# Patient Record
Sex: FEMALE | Race: WHITE
[De-identification: names, ages, dates, MRNs, and addresses within clinical notes are randomized per-mention and may not be internally consistent; named-entity substitution may affect disease eponyms.]

---

## 2019-04-12 ENCOUNTER — HOSPITAL ENCOUNTER (OUTPATIENT)
Dept: HOSPITAL 47 - RADUSWWP | Age: 29
Discharge: HOME | End: 2019-04-12
Payer: COMMERCIAL

## 2019-04-12 DIAGNOSIS — N64.4: Primary | ICD-10-CM

## 2019-04-12 DIAGNOSIS — N63.10: ICD-10-CM

## 2019-04-12 NOTE — USB
Reason for exam: clinical finding.

Indicated problem(s): lump or thickening in the right breast.



Physical Findings:

Nurse did not find any significant physical abnormalities on exam.



US Breast RT

Right complete breast ultrasound includes all four quadrants, the retroareolar 

region and axilla. Finding is negative.



These results were verbally communicated with the patient and result sheet given 

to the patient on 4/12/19.





ASSESSMENT: Negative, BI-RAD 1



RECOMMENDATION:

Routine screening mammogram of both breasts at age 38.

Manage patient on a clinical basis.

## 2022-07-30 ENCOUNTER — HOSPITAL ENCOUNTER (INPATIENT)
Dept: HOSPITAL 47 - EC | Age: 32
LOS: 3 days | Discharge: HOME | DRG: 872 | End: 2022-08-02
Attending: INTERNAL MEDICINE | Admitting: INTERNAL MEDICINE
Payer: COMMERCIAL

## 2022-07-30 DIAGNOSIS — A41.59: Primary | ICD-10-CM

## 2022-07-30 DIAGNOSIS — Z20.822: ICD-10-CM

## 2022-07-30 DIAGNOSIS — K81.9: ICD-10-CM

## 2022-07-30 DIAGNOSIS — F90.9: ICD-10-CM

## 2022-07-30 DIAGNOSIS — K59.00: ICD-10-CM

## 2022-07-30 DIAGNOSIS — N10: ICD-10-CM

## 2022-07-30 LAB
ALBUMIN SERPL-MCNC: 4.1 G/DL (ref 3.5–5)
ALP SERPL-CCNC: 63 U/L (ref 38–126)
ALT SERPL-CCNC: 25 U/L (ref 4–34)
ANION GAP SERPL CALC-SCNC: 6 MMOL/L
AST SERPL-CCNC: 33 U/L (ref 14–36)
BASOPHILS # BLD AUTO: 0.1 K/UL (ref 0–0.2)
BASOPHILS NFR BLD AUTO: 0 %
BUN SERPL-SCNC: 14 MG/DL (ref 7–17)
CALCIUM SPEC-MCNC: 9.2 MG/DL (ref 8.4–10.2)
CHLORIDE SERPL-SCNC: 100 MMOL/L (ref 98–107)
CO2 SERPL-SCNC: 26 MMOL/L (ref 22–30)
EOSINOPHIL # BLD AUTO: 0.1 K/UL (ref 0–0.7)
EOSINOPHIL NFR BLD AUTO: 1 %
ERYTHROCYTE [DISTWIDTH] IN BLOOD BY AUTOMATED COUNT: 3.96 M/UL (ref 3.8–5.4)
ERYTHROCYTE [DISTWIDTH] IN BLOOD: 13.9 % (ref 11.5–15.5)
GLUCOSE SERPL-MCNC: 113 MG/DL (ref 74–99)
HCT VFR BLD AUTO: 37.7 % (ref 34–46)
HGB BLD-MCNC: 12.5 GM/DL (ref 11.4–16)
HYALINE CASTS UR QL AUTO: 1 /LPF (ref 0–2)
LIPASE SERPL-CCNC: 42 U/L (ref 23–300)
LYMPHOCYTES # SPEC AUTO: 0.8 K/UL (ref 1–4.8)
LYMPHOCYTES NFR SPEC AUTO: 6 %
MCH RBC QN AUTO: 31.4 PG (ref 25–35)
MCHC RBC AUTO-ENTMCNC: 33 G/DL (ref 31–37)
MCV RBC AUTO: 95.1 FL (ref 80–100)
MONOCYTES # BLD AUTO: 0.5 K/UL (ref 0–1)
MONOCYTES NFR BLD AUTO: 3 %
NEUTROPHILS # BLD AUTO: 12.6 K/UL (ref 1.3–7.7)
NEUTROPHILS NFR BLD AUTO: 89 %
PH UR: 6.5 [PH] (ref 5–8)
PLATELET # BLD AUTO: 305 K/UL (ref 150–450)
POTASSIUM SERPL-SCNC: 4.1 MMOL/L (ref 3.5–5.1)
PROT SERPL-MCNC: 7.1 G/DL (ref 6.3–8.2)
PROT UR QL: (no result)
RBC UR QL: 8 /HPF (ref 0–5)
SODIUM SERPL-SCNC: 132 MMOL/L (ref 137–145)
SP GR UR: 1.02 (ref 1–1.03)
SQUAMOUS UR QL AUTO: <1 /HPF (ref 0–4)
UROBILINOGEN UR QL STRIP: <2 MG/DL (ref ?–2)
WBC # BLD AUTO: 14.1 K/UL (ref 3.8–10.6)
WBC # UR AUTO: 66 /HPF (ref 0–5)

## 2022-07-30 PROCEDURE — 83690 ASSAY OF LIPASE: CPT

## 2022-07-30 PROCEDURE — 36415 COLL VENOUS BLD VENIPUNCTURE: CPT

## 2022-07-30 PROCEDURE — 87040 BLOOD CULTURE FOR BACTERIA: CPT

## 2022-07-30 PROCEDURE — 76705 ECHO EXAM OF ABDOMEN: CPT

## 2022-07-30 PROCEDURE — 84703 CHORIONIC GONADOTROPIN ASSAY: CPT

## 2022-07-30 PROCEDURE — 87186 SC STD MICRODIL/AGAR DIL: CPT

## 2022-07-30 PROCEDURE — 96375 TX/PRO/DX INJ NEW DRUG ADDON: CPT

## 2022-07-30 PROCEDURE — 87635 SARS-COV-2 COVID-19 AMP PRB: CPT

## 2022-07-30 PROCEDURE — 81001 URINALYSIS AUTO W/SCOPE: CPT

## 2022-07-30 PROCEDURE — 80053 COMPREHEN METABOLIC PANEL: CPT

## 2022-07-30 PROCEDURE — 74021 RADEX ABDOMEN 3+ VIEWS: CPT

## 2022-07-30 PROCEDURE — 83605 ASSAY OF LACTIC ACID: CPT

## 2022-07-30 PROCEDURE — 99285 EMERGENCY DEPT VISIT HI MDM: CPT

## 2022-07-30 PROCEDURE — 96366 THER/PROPH/DIAG IV INF ADDON: CPT

## 2022-07-30 PROCEDURE — 96365 THER/PROPH/DIAG IV INF INIT: CPT

## 2022-07-30 PROCEDURE — 87077 CULTURE AEROBIC IDENTIFY: CPT

## 2022-07-30 PROCEDURE — 85025 COMPLETE CBC W/AUTO DIFF WBC: CPT

## 2022-07-30 PROCEDURE — 81025 URINE PREGNANCY TEST: CPT

## 2022-07-30 PROCEDURE — 87086 URINE CULTURE/COLONY COUNT: CPT

## 2022-07-30 PROCEDURE — 74177 CT ABD & PELVIS W/CONTRAST: CPT

## 2022-07-30 RX ADMIN — Medication PRN MG: at 23:10

## 2022-07-30 RX ADMIN — ACETAMINOPHEN PRN MG: 325 TABLET, FILM COATED ORAL at 22:33

## 2022-07-30 RX ADMIN — CEFAZOLIN SCH MLS/HR: 330 INJECTION, POWDER, FOR SOLUTION INTRAMUSCULAR; INTRAVENOUS at 23:10

## 2022-07-30 RX ADMIN — MORPHINE SULFATE PRN MG: 4 INJECTION, SOLUTION INTRAMUSCULAR; INTRAVENOUS at 21:21

## 2022-07-30 NOTE — US
EXAMINATION TYPE: US gallbladder

 

DATE OF EXAM: 7/30/2022

 

COMPARISON:

 

CLINICAL HISTORY: Right upper quadrant abdominal pain. Pain.  Fever per patient.

 

TECHNIQUE: Multiple sonographic images of the right upper quadrant are obtained.

 

FINDINGS:

 

EXAM MEASUREMENTS:

 

Liver Length:  17.1 cm   

Gallbladder Wall:  0.1 cm   

CBD:  0.5 cm

Right Kidney:  12.6 x 5.7 x 5.8 cm

 

Pancreas:  wnl

Liver:  wnl  

Gallbladder:  wnl

**Evidence for sonographic Duque's sign:  neg

CBD:  wnl 

Right Kidney:  No hydronephrosis or masses seen 

 

 

 

IMPRESSION: 

Normal exam. No gallstones or dilated ducts.

## 2022-07-30 NOTE — ED
Abdominal Pain HPI





- General


Chief Complaint: Abdominal Pain


Stated Complaint: Abd Pain/Sent by urgent care


Time Seen by Provider: 07/30/22 16:03


Source: patient, RN notes reviewed


Mode of arrival: ambulatory


Limitations: no limitations





- History of Present Illness


Initial Comments: 





This is a pleasant 32-year-old female who presents to emergency department 

complaining of right-sided abdominal pain for the past 4 days.  Patient also 

complaining of fever and shaking chills.  Loss of appetite and pain in the right

upper quadrant currently.  Sharp in nature, exacerbated by movement, deep 

breathing, and palpation.  Does radiate to the right back area.





No headache, no changes in vision or hearing, no sore throat or difficulty with 

speech, no neck pain, no chest pain or shortness of breath,  no vomiting, no 

changes in urination or bowel movements, no numbness or tingling, no extremity 

pain, no skin rashes or lesions.


Denies vaginal discharge.  Denies chance of pregnancy.  No previous surgeries.





Past medical, surgical, social, and family history reviewed.


MD Complaint: abdominal pain





- Related Data


                                    Allergies











Allergy/AdvReac Type Severity Reaction Status Date / Time


 


No Known Allergies Allergy   Verified 07/30/22 15:56














Review of Systems


ROS Statement: 


Those systems with pertinent positive or pertinent negative responses have been 

documented in the HPI.





ROS Other: All systems not noted in ROS Statement are negative.





Past Medical History


Past Medical History: No Reported History


History of Any Multi-Drug Resistant Organisms: None Reported


Past Surgical History: No Surgical Hx Reported


Past Psychological History: No Psychological Hx Reported


Smoking Status: Never smoker


Past Alcohol Use History: None Reported


Past Drug Use History: Marijuana





General Exam





- General Exam Comments


Initial Comments: 





Patient tachycardic, remainder of vital signs are stable.  Patient does have 

notable shaking chills.  Appears to be adequately perfused.  Normal capillary 

refill.  No mottling.  Moist mucous membranes


Limitations: no limitations


General appearance: in distress


Head exam: Present: atraumatic, normocephalic, normal inspection


Eye exam: Present: normal appearance, PERRL, EOMI.  Absent: scleral icterus, 

conjunctival injection, periorbital swelling


ENT exam: Present: normal exam, normal oropharynx, mucous membranes dry, mucous 

membranes moist, normal external ear exam


Neck exam: Present: normal inspection, full ROM.  Absent: tenderness, 

meningismus, lymphadenopathy


Respiratory exam: Present: normal lung sounds bilaterally.  Absent: respiratory 

distress, wheezes, rales, rhonchi, stridor, chest wall tenderness, accessory 

muscle use, decreased breath sounds, prolonged expiratory


Cardiovascular Exam: Present: regular rate, normal rhythm, tachycardia, normal 

heart sounds.  Absent: systolic murmur, diastolic murmur, rubs, gallop, clicks


GI/Abdominal exam: Present: soft, tenderness (Right upper quadrant), guarding, 

diminished bowel sounds.  Absent: distended, rebound, rigid


  ** Expanded


GI/Abdominal exam: Present: Duque's sign.  Absent: Rovsing's sign, tenderness 

at McBurney's Point


Rectal exam: Present: deferred


External exam: Present: other (Deferred)


Extremities exam: Present: normal inspection, full ROM, normal capillary refill.

 Absent: tenderness, pedal edema, joint swelling, calf tenderness


Back exam: Present: normal inspection


Neurological exam: Present: alert, oriented X3, CN II-XII intact


Psychiatric exam: Present: normal affect, normal mood


Skin exam: Present: warm, dry, intact, normal color.  Absent: rash





Course


                                   Vital Signs











  07/30/22 07/30/22





  15:54 18:00


 


Temperature 98.3 F 


 


Pulse Rate 111 H 


 


Respiratory 20 18





Rate  


 


Blood Pressure 145/83 109/68


 


O2 Sat by Pulse 100 





Oximetry  














- Reevaluation(s)


Reevaluation #1: 





07/30/22 17:32


Medical record is reviewed


Symptoms are improved after pain medications


Patient is informed of results and questions answered


Mild distress


Reevaluation #2: 





07/30/22 18:16


She reevaluated, patient doing no better.  We'll add on a dose of morphine.  

Gallbladder ultrasound was essentially normal.  Her back to considering 

appendicitis as the patient's pain actually started the right lower quadrant.  

Computed tomography scan ordered.


Reevaluation #3: 





07/30/22 20:03


Patient episode of vomiting despite antiemetics in the room.  We'll consider 

repeat antiemetics.  Plan for admission





- Consultations


Consultation #1: 





Case discussed in detail with Dr. kennedy, patient accepted for admission for 

sepsis and pyelonephritis





Medical Decision Making





- Medical Decision Making





Patient presents with right abdominal pain.  Most consistent with cholecystitis.

 Although the patient states that the pain seemed to start in the right lower 

quadrant a few days ago and now has relocated to the right upper quadrant.  

Appendicitis possible but less likely as she has no tenderness in the right 

lower quadrant currently.  Patient has no vaginal discharge.  Gynecological 

etiology less likely.  Although Gl[place] Burton syndrome is a possibility.  Does

not seem to be consistent with ureteral colic.  Pyelonephritis possible.  We'll 

order a general abdominal workup, blood cultures, lactic acid, Zosyn, 

gallbladder ultrasound, and x-rays.  Plan for evaluation





The case was discussed in detail with ED attending physician.  Presentation, 

findings, treatment plan discussed in detail.





Supervisor Dr. Butler





Patient was vomiting in the room on reassessment.  Patient be admitted for 

sepsis and pyelonephritis.  Zosyn and switched to Rocephin after discussion with

the hospitalist physician.





- Lab Data


Result diagrams: 


                                 07/30/22 16:26





                                 07/30/22 16:26


                                   Lab Results











  07/30/22 07/30/22 07/30/22 Range/Units





  16:26 16:26 16:26 


 


WBC  14.1 H    (3.8-10.6)  k/uL


 


RBC  3.96    (3.80-5.40)  m/uL


 


Hgb  12.5    (11.4-16.0)  gm/dL


 


Hct  37.7    (34.0-46.0)  %


 


MCV  95.1    (80.0-100.0)  fL


 


MCH  31.4    (25.0-35.0)  pg


 


MCHC  33.0    (31.0-37.0)  g/dL


 


RDW  13.9    (11.5-15.5)  %


 


Plt Count  305    (150-450)  k/uL


 


MPV  7.3    


 


Neutrophils %  89    %


 


Lymphocytes %  6    %


 


Monocytes %  3    %


 


Eosinophils %  1    %


 


Basophils %  0    %


 


Neutrophils #  12.6 H    (1.3-7.7)  k/uL


 


Lymphocytes #  0.8 L    (1.0-4.8)  k/uL


 


Monocytes #  0.5    (0-1.0)  k/uL


 


Eosinophils #  0.1    (0-0.7)  k/uL


 


Basophils #  0.1    (0-0.2)  k/uL


 


Sodium     (137-145)  mmol/L


 


Potassium     (3.5-5.1)  mmol/L


 


Chloride     ()  mmol/L


 


Carbon Dioxide     (22-30)  mmol/L


 


Anion Gap     mmol/L


 


BUN     (7-17)  mg/dL


 


Creatinine     (0.52-1.04)  mg/dL


 


Est GFR (CKD-EPI)AfAm     (>60 ml/min/1.73 sqM)  


 


Est GFR (CKD-EPI)NonAf     (>60 ml/min/1.73 sqM)  


 


Glucose     (74-99)  mg/dL


 


Plasma Lactic Acid Jose     (0.7-2.0)  mmol/L


 


Calcium     (8.4-10.2)  mg/dL


 


Total Bilirubin     (0.2-1.3)  mg/dL


 


AST     (14-36)  U/L


 


ALT     (4-34)  U/L


 


Alkaline Phosphatase     ()  U/L


 


Total Protein     (6.3-8.2)  g/dL


 


Albumin     (3.5-5.0)  g/dL


 


Lipase     ()  U/L


 


Urine Color   Yellow   


 


Urine Appearance   Clear   (Clear)  


 


Urine pH   6.5   (5.0-8.0)  


 


Ur Specific Gravity   1.021   (1.001-1.035)  


 


Urine Protein   1+ H   (Negative)  


 


Urine Glucose (UA)   Negative   (Negative)  


 


Urine Ketones   Negative   (Negative)  


 


Urine Blood   Negative   (Negative)  


 


Urine Nitrite   Positive H   (Negative)  


 


Urine Bilirubin   Negative   (Negative)  


 


Urine Urobilinogen   <2.0   (<2.0)  mg/dL


 


Ur Leukocyte Esterase   Moderate H   (Negative)  


 


Urine RBC   8 H   (0-5)  /hpf


 


Urine WBC   66 H   (0-5)  /hpf


 


Ur Squamous Epith Cells   <1   (0-4)  /hpf


 


Urine Bacteria   Moderate H   (None)  /hpf


 


Hyaline Casts   1   (0-2)  /lpf


 


Urine Mucus   Rare H   (None)  /hpf


 


Urine HCG, Qual    Not Detected  (Not Detectd)  


 


Coronavirus (PCR)     (Not Detectd)  














  07/30/22 07/30/22 07/30/22 Range/Units





  16:26 16:26 18:38 


 


WBC     (3.8-10.6)  k/uL


 


RBC     (3.80-5.40)  m/uL


 


Hgb     (11.4-16.0)  gm/dL


 


Hct     (34.0-46.0)  %


 


MCV     (80.0-100.0)  fL


 


MCH     (25.0-35.0)  pg


 


MCHC     (31.0-37.0)  g/dL


 


RDW     (11.5-15.5)  %


 


Plt Count     (150-450)  k/uL


 


MPV     


 


Neutrophils %     %


 


Lymphocytes %     %


 


Monocytes %     %


 


Eosinophils %     %


 


Basophils %     %


 


Neutrophils #     (1.3-7.7)  k/uL


 


Lymphocytes #     (1.0-4.8)  k/uL


 


Monocytes #     (0-1.0)  k/uL


 


Eosinophils #     (0-0.7)  k/uL


 


Basophils #     (0-0.2)  k/uL


 


Sodium  132 L    (137-145)  mmol/L


 


Potassium  4.1    (3.5-5.1)  mmol/L


 


Chloride  100    ()  mmol/L


 


Carbon Dioxide  26    (22-30)  mmol/L


 


Anion Gap  6    mmol/L


 


BUN  14    (7-17)  mg/dL


 


Creatinine  0.56    (0.52-1.04)  mg/dL


 


Est GFR (CKD-EPI)AfAm  >90    (>60 ml/min/1.73 sqM)  


 


Est GFR (CKD-EPI)NonAf  >90    (>60 ml/min/1.73 sqM)  


 


Glucose  113 H    (74-99)  mg/dL


 


Plasma Lactic Acid Jose   0.9   (0.7-2.0)  mmol/L


 


Calcium  9.2    (8.4-10.2)  mg/dL


 


Total Bilirubin  0.4    (0.2-1.3)  mg/dL


 


AST  33    (14-36)  U/L


 


ALT  25    (4-34)  U/L


 


Alkaline Phosphatase  63    ()  U/L


 


Total Protein  7.1    (6.3-8.2)  g/dL


 


Albumin  4.1    (3.5-5.0)  g/dL


 


Lipase  42    ()  U/L


 


Urine Color     


 


Urine Appearance     (Clear)  


 


Urine pH     (5.0-8.0)  


 


Ur Specific Gravity     (1.001-1.035)  


 


Urine Protein     (Negative)  


 


Urine Glucose (UA)     (Negative)  


 


Urine Ketones     (Negative)  


 


Urine Blood     (Negative)  


 


Urine Nitrite     (Negative)  


 


Urine Bilirubin     (Negative)  


 


Urine Urobilinogen     (<2.0)  mg/dL


 


Ur Leukocyte Esterase     (Negative)  


 


Urine RBC     (0-5)  /hpf


 


Urine WBC     (0-5)  /hpf


 


Ur Squamous Epith Cells     (0-4)  /hpf


 


Urine Bacteria     (None)  /hpf


 


Hyaline Casts     (0-2)  /lpf


 


Urine Mucus     (None)  /hpf


 


Urine HCG, Qual     (Not Detectd)  


 


Coronavirus (PCR)    Not Detected  (Not Detectd)  














Disposition


Clinical Impression: 


 Sepsis, Pyelonephritis





Disposition: ADMITTED AS IP TO THIS HOSP


Condition: Fair


Referrals: 


Ramonita Lester MD [Primary Care Provider] - 1-2 days


Time of Disposition: 20:04


Decision to Admit Reason: Admit from EC


Decision Time: 20:04

## 2022-07-30 NOTE — CT
EXAMINATION TYPE: CT abdomen pelvis w con

 

DATE OF EXAM: 7/30/2022

 

COMPARISON: None

 

HISTORY: Right-sided abdominal pain, radiates to lower back

 

CT DLP: 675.8 mGycm

Automated exposure control for dose reduction was used.

 

CONTRAST: 

Performed with IV Contrast, patient injected with 100ml mL of Isovue 300.

 

Images obtained from the diaphragm to the floor the pelvis with IV contrast.

 

Lung bases show mild subsegmental atelectasis. Heart size is normal. No pericardial effusion. No pleu
ral effusion.

 

Liver spleen stomach and pancreas appear intact. The bile duct is not dilated. Gallbladder is intact.
 Gallbladder measures 3.6 cm.

 

There is no adrenal mass. There is patchy decreased enhancement in the right kidney in the upper and 
lower pole. Left kidney appears fairly normal. There is no hydronephrosis. Delayed images show normal
 renal excretion. No retroperitoneal adenopathy. The bladder distends smoothly. Uterus is retroverted
.

 

Lumbar vertebrae have normal alignment. There is vacuum disc at L5-S1. There is spurring of the endpl
ates. Bony pelvis is intact. The hip joints are intact. Appendix not seen. No sign of thickened appen
rita.

 

There is no mesenteric edema. No ascites or free air. No bowel obstruction. There is mild edema aroun
d the right kidney.

 

IMPRESSION:

Patchy decreased enhancement of the right kidney with perinephric edema that is consistent with multi
focal acute pyelonephritis. No renal obstruction.

## 2022-07-30 NOTE — XR
EXAMINATION TYPE: XR abdomen complete w decub

 

DATE OF EXAM: 7/30/2022

 

COMPARISON: NONE

 

HISTORY: Right upper quadrant pain

 

TECHNIQUE: 4 views

 

FINDINGS: There is a single loop of gas-distended small bowel in the left mid abdomen. Fecal pattern 
is normal. No evidence of a mass. There is IUD. Lung bases are clear. No calcifications seen over the
 kidneys.

 

IMPRESSION: Distended small bowel loop could relate to some mild focal ileus. No free air.

## 2022-07-31 LAB
BASOPHILS # BLD AUTO: 0.05 X 10*3/UL (ref 0–0.1)
BASOPHILS NFR BLD AUTO: 0.4 %
EOSINOPHIL # BLD AUTO: 0.01 X 10*3/UL (ref 0.04–0.35)
EOSINOPHIL NFR BLD AUTO: 0.1 %
ERYTHROCYTE [DISTWIDTH] IN BLOOD BY AUTOMATED COUNT: 3.44 X 10*6/UL (ref 4.1–5.2)
ERYTHROCYTE [DISTWIDTH] IN BLOOD: 14.4 % (ref 11.5–14.5)
HCT VFR BLD AUTO: 31.9 % (ref 37.2–46.3)
HGB BLD-MCNC: 9.9 G/DL (ref 12–15)
IMM GRANULOCYTES BLD QL AUTO: 0.6 %
LYMPHOCYTES # SPEC AUTO: 0.84 X 10*3/UL (ref 0.9–5)
LYMPHOCYTES NFR SPEC AUTO: 6 %
MCH RBC QN AUTO: 28.8 PG (ref 27–32)
MCHC RBC AUTO-ENTMCNC: 31 G/DL (ref 32–37)
MCV RBC AUTO: 92.7 FL (ref 80–97)
MONOCYTES # BLD AUTO: 1.26 X 10*3/UL (ref 0.2–1)
MONOCYTES NFR BLD AUTO: 9 %
NEUTROPHILS # BLD AUTO: 11.82 X 10*3/UL (ref 1.8–7.7)
NEUTROPHILS NFR BLD AUTO: 83.9 %
NRBC BLD AUTO-RTO: 0 /100 WBCS (ref 0–0)
PLATELET # BLD AUTO: 271 X 10*3/UL (ref 140–440)
WBC # BLD AUTO: 14.06 X 10*3/UL (ref 4.5–10)

## 2022-07-31 RX ADMIN — PANTOPRAZOLE SODIUM SCH MG: 40 INJECTION, POWDER, FOR SOLUTION INTRAVENOUS at 13:40

## 2022-07-31 RX ADMIN — ACETAMINOPHEN PRN MG: 325 TABLET, FILM COATED ORAL at 22:45

## 2022-07-31 RX ADMIN — ACETAMINOPHEN PRN MG: 325 TABLET, FILM COATED ORAL at 11:22

## 2022-07-31 RX ADMIN — CEFAZOLIN SCH MLS/HR: 330 INJECTION, POWDER, FOR SOLUTION INTRAMUSCULAR; INTRAVENOUS at 04:26

## 2022-07-31 RX ADMIN — PANTOPRAZOLE SODIUM SCH MG: 40 INJECTION, POWDER, FOR SOLUTION INTRAVENOUS at 21:04

## 2022-07-31 RX ADMIN — MORPHINE SULFATE PRN MG: 4 INJECTION, SOLUTION INTRAMUSCULAR; INTRAVENOUS at 04:28

## 2022-07-31 RX ADMIN — HYDROCODONE BITARTRATE AND ACETAMINOPHEN PRN EACH: 5; 325 TABLET ORAL at 23:56

## 2022-07-31 RX ADMIN — ONDANSETRON PRN MG: 2 INJECTION INTRAMUSCULAR; INTRAVENOUS at 04:42

## 2022-07-31 RX ADMIN — CEFAZOLIN SCH: 330 INJECTION, POWDER, FOR SOLUTION INTRAMUSCULAR; INTRAVENOUS at 11:04

## 2022-07-31 RX ADMIN — ACETAMINOPHEN PRN MG: 325 TABLET, FILM COATED ORAL at 04:26

## 2022-07-31 RX ADMIN — MORPHINE SULFATE PRN MG: 4 INJECTION, SOLUTION INTRAMUSCULAR; INTRAVENOUS at 17:38

## 2022-07-31 RX ADMIN — HYDROCODONE BITARTRATE AND ACETAMINOPHEN PRN EACH: 5; 325 TABLET ORAL at 13:40

## 2022-07-31 RX ADMIN — MORPHINE SULFATE PRN MG: 4 INJECTION, SOLUTION INTRAMUSCULAR; INTRAVENOUS at 09:20

## 2022-07-31 RX ADMIN — CEFAZOLIN SCH MLS/HR: 330 INJECTION, POWDER, FOR SOLUTION INTRAMUSCULAR; INTRAVENOUS at 20:26

## 2022-07-31 RX ADMIN — MORPHINE SULFATE PRN MG: 4 INJECTION, SOLUTION INTRAMUSCULAR; INTRAVENOUS at 21:36

## 2022-07-31 NOTE — HP
HISTORY AND PHYSICAL



CHIEF COMPLAINTS:

Abdominal pain and fever.



HISTORY OF PRESENT ILLNESS:

This 32-year-old woman with a past medical history of no significant medical 
issues,

followed by Dr. Lester in the outpatient setting, was not feeling well over the 
past

several days.  The patient is mainly complaining of right-sided abdominal pain. 
Patient

had back pain also, but currently the patient is complaining of more pain in the

anterior part.  The patient also had some loss of appetite. Patient was found to
have

UTI and possible pyelonephritis.  Ultrasound of the gallbladder did not show any
acute

abnormality.  The CT scan was reviewed personally by me.  Patient is started on 
broad-

spectrum IV antibiotics. Cultures are obtained. Infectious disease evaluation is

recommended, also.  The CT scan showed patchy decreased enhancement of the right
kidney

with perinephric edema with multifocal acute pyelonephritis.



PAST MEDICAL HISTORY:

No significant cardiorespiratory illness.



HOME MEDICATIONS:

Reviewed. They include Motrin p.r.n.



ALLERGIES:

NONE.



FAMILY HISTORY:

No history of heart disease or strokes in the family.



SOCIAL HISTORY:

No history of smoking.



REVIEW OF SYSTEMS:

Fourteen-point review of systems negative except as mentioned earlier.



PHYSICAL EXAMINATION:

Pulse is 88, blood pressure 107/70, respiration 18.

HEENT: Conjunctivae normal.

NECK: No jugular venous distention.

CARDIOVASCULAR: S1, S2 muffled.

RESPIRATION: Breath sounds diminished at the bases.  No rhonchi. No crackles.

ABDOMEN: Soft. Mild diffuse tenderness in the right side present.  Otherwise, no

guarding, no rigidity.  No mass palpable.

LEGS: No edema. No swelling.

NERVOUS SYSTEM: Higher functions as mentioned earlier. Moves all 4 limbs.  No 
focal

motor or sensory deficit.

LYMPHATICS: No lymph node palpable in neck, axillae or groin.

SKIN: No ulcer, rash, bleeding.

JOINTS: No active deforming arthropathy.



LABS:

WBC 14.6. Sodium is ntd



ASSESSMENT:

1. Acute right pyelonephritis with severe pain.

2. Elevated white count.



RECOMMENDATIONS AND DISCUSSION:

In this 32-year-old woman who presented with multiple complex medical issues, at
this

time I recommend to continue current medications. Patient is started on IV 
Rocephin.  I

would recommend infectious disease evaluation.  Repeat cultures.  DVT 
prophylaxis.

Continue to monitor. Pain management.  The prognosis is guarded because of the 
multiple

complex medical issues. Further recommendations to follow. See orders for 
further

details. Dr. Lester will follow tomorrow.





MMODL / IJN: [id number] / Job#: [id number]

BLANCA

## 2022-08-01 LAB
ALBUMIN SERPL-MCNC: 3.3 G/DL (ref 3.8–4.9)
ALBUMIN/GLOB SERPL: 1.43 G/DL (ref 1.6–3.17)
ALP SERPL-CCNC: 94 U/L (ref 41–126)
ALT SERPL-CCNC: 44 U/L (ref 8–44)
ANION GAP SERPL CALC-SCNC: 6.5 MMOL/L (ref 10–18)
AST SERPL-CCNC: 41 U/L (ref 13–35)
BASOPHILS # BLD AUTO: 0.04 X 10*3/UL (ref 0–0.1)
BASOPHILS NFR BLD AUTO: 0.4 %
BUN SERPL-SCNC: 7.8 MG/DL (ref 9–27)
BUN/CREAT SERPL: 15.6 RATIO (ref 12–20)
CALCIUM SPEC-MCNC: 8.2 MG/DL (ref 8.7–10.3)
CHLORIDE SERPL-SCNC: 104 MMOL/L (ref 96–109)
CO2 SERPL-SCNC: 25.5 MMOL/L (ref 20–27.5)
EOSINOPHIL # BLD AUTO: 0.11 X 10*3/UL (ref 0.04–0.35)
EOSINOPHIL NFR BLD AUTO: 1 %
ERYTHROCYTE [DISTWIDTH] IN BLOOD BY AUTOMATED COUNT: 3.68 X 10*6/UL (ref 4.1–5.2)
ERYTHROCYTE [DISTWIDTH] IN BLOOD: 14.6 % (ref 11.5–14.5)
GLOBULIN SER CALC-MCNC: 2.3 G/DL (ref 1.6–3.3)
GLUCOSE SERPL-MCNC: 117 MG/DL (ref 70–110)
HCT VFR BLD AUTO: 35 % (ref 37.2–46.3)
HGB BLD-MCNC: 10.6 G/DL (ref 12–15)
IMM GRANULOCYTES BLD QL AUTO: 0.4 %
LYMPHOCYTES # SPEC AUTO: 2.02 X 10*3/UL (ref 0.9–5)
LYMPHOCYTES NFR SPEC AUTO: 19.1 %
MCH RBC QN AUTO: 28.8 PG (ref 27–32)
MCHC RBC AUTO-ENTMCNC: 30.3 G/DL (ref 32–37)
MCV RBC AUTO: 95.1 FL (ref 80–97)
MONOCYTES # BLD AUTO: 1.02 X 10*3/UL (ref 0.2–1)
MONOCYTES NFR BLD AUTO: 9.6 %
NEUTROPHILS # BLD AUTO: 7.37 X 10*3/UL (ref 1.8–7.7)
NEUTROPHILS NFR BLD AUTO: 69.5 %
NRBC BLD AUTO-RTO: 0 /100 WBCS (ref 0–0)
PLATELET # BLD AUTO: 296 X 10*3/UL (ref 140–440)
POTASSIUM SERPL-SCNC: 3.9 MMOL/L (ref 3.5–5.5)
PROT SERPL-MCNC: 5.6 G/DL (ref 6.2–8.2)
SODIUM SERPL-SCNC: 136 MMOL/L (ref 135–145)
WBC # BLD AUTO: 10.6 X 10*3/UL (ref 4.5–10)

## 2022-08-01 RX ADMIN — MORPHINE SULFATE PRN MG: 4 INJECTION, SOLUTION INTRAMUSCULAR; INTRAVENOUS at 17:10

## 2022-08-01 RX ADMIN — CEFAZOLIN SCH: 330 INJECTION, POWDER, FOR SOLUTION INTRAMUSCULAR; INTRAVENOUS at 12:03

## 2022-08-01 RX ADMIN — MORPHINE SULFATE PRN MG: 4 INJECTION, SOLUTION INTRAMUSCULAR; INTRAVENOUS at 22:20

## 2022-08-01 RX ADMIN — MORPHINE SULFATE PRN MG: 4 INJECTION, SOLUTION INTRAMUSCULAR; INTRAVENOUS at 09:51

## 2022-08-01 RX ADMIN — Medication PRN MG: at 22:20

## 2022-08-01 RX ADMIN — PANTOPRAZOLE SODIUM SCH MG: 40 INJECTION, POWDER, FOR SOLUTION INTRAVENOUS at 09:52

## 2022-08-01 RX ADMIN — HYDROCODONE BITARTRATE AND ACETAMINOPHEN PRN EACH: 5; 325 TABLET ORAL at 18:13

## 2022-08-01 RX ADMIN — MORPHINE SULFATE PRN MG: 4 INJECTION, SOLUTION INTRAMUSCULAR; INTRAVENOUS at 05:48

## 2022-08-01 RX ADMIN — CEFAZOLIN SCH: 330 INJECTION, POWDER, FOR SOLUTION INTRAMUSCULAR; INTRAVENOUS at 19:31

## 2022-08-01 RX ADMIN — CEFAZOLIN SCH MLS/HR: 330 INJECTION, POWDER, FOR SOLUTION INTRAMUSCULAR; INTRAVENOUS at 01:52

## 2022-08-01 RX ADMIN — ONDANSETRON PRN MG: 2 INJECTION INTRAMUSCULAR; INTRAVENOUS at 20:13

## 2022-08-01 RX ADMIN — MORPHINE SULFATE PRN MG: 4 INJECTION, SOLUTION INTRAMUSCULAR; INTRAVENOUS at 01:48

## 2022-08-01 RX ADMIN — PANTOPRAZOLE SODIUM SCH MG: 40 INJECTION, POWDER, FOR SOLUTION INTRAVENOUS at 20:13

## 2022-08-01 RX ADMIN — HYDROCODONE BITARTRATE AND ACETAMINOPHEN PRN EACH: 5; 325 TABLET ORAL at 12:05

## 2022-08-01 NOTE — P.CONS
History of Present Illness





- Reason for Consult


Consult date: 07/31/22





- History of Present Illness





Patient is a 32-year-old  female presenting to the hospital for 

evaluation of right-sided abdominal pain that has been going on for the last 4 

days patient described the pain to be more of a sharp in nature intensity is 

almost 10 out of 10 by the time she presented to the hospital with associated 

nausea and vomiting patient complaining of pain mostly in the right upper 

quadrant as well as to the right flank area patient denies having any diarrhea 

on presentation to the hospital the patient was initially afebrile subsequently 

she did spike a fever this afternoon of 100.1 high degree Fahrenheit patient did

have white count of 14.1 with a left shift kidney function has been normal 

patient did have positive UA blood and urine cultures are currently pending 

patient did have a gallbladder ultrasound that was negative no gallstones or 

dilated duct patient did have a CT of abdominal pelvis patchy enhancement of the

right kidney with perinephric edema consistent with the pyelonephritis no 

obstruction patient was started on ceftriaxone infectious disease was consulted 

for further management of antibiotic therapy





Past Medical History


Past Medical History: No Reported History


History of Any Multi-Drug Resistant Organisms: None Reported


Past Surgical History: No Surgical Hx Reported


Past Psychological History: No Psychological Hx Reported


Smoking Status: Never smoker


Past Alcohol Use History: None Reported


Past Drug Use History: Marijuana





Medications and Allergies


                                Home Medications











 Medication  Instructions  Recorded  Confirmed  Type


 


Ibuprofen [Motrin] 800 mg PO BID PRN 07/30/22 07/30/22 History








                                    Allergies











Allergy/AdvReac Type Severity Reaction Status Date / Time


 


No Known Allergies Allergy   Verified 07/30/22 20:36














Physical Exam


Vitals: 


                                   Vital Signs











  Temp Pulse Pulse Resp BP BP Pulse Ox


 


 07/31/22 14:16  99.9 F H   81  16   107/67  98


 


 07/31/22 14:00    81  16   


 


 07/31/22 13:30  100.1 F H   75    100/61 


 


 07/31/22 12:18  99.9 F H      


 


 07/31/22 11:20    88    107/70  100


 


 07/31/22 09:27    81  18   100/64 


 


 07/31/22 08:00    81  18   


 


 07/31/22 07:00  98.1 F   76  16   93/53  95


 


 07/31/22 03:28  98.4 F   84  18   113/69 


 


 07/31/22 02:30  98.9 F   84  18   100/50  95


 


 07/31/22 02:00    87    91/52 


 


 07/31/22 01:02  98.4 F   83  16   102/58  97


 


 07/30/22 22:30    84  18   


 


 07/30/22 22:01  99.2 F   95  17   108/69  100


 


 07/30/22 18:00     18  109/68  


 


 07/30/22 15:54  98.3 F  111 H   20  145/83   100








                                Intake and Output











 07/30/22 07/31/22 07/31/22





 22:59 06:59 14:59


 


Other:   


 


  # Voids  3 4


 


  Weight 70.307 kg  














Results


CBC & Chem 7: 


                                 07/31/22 05:09





                                 07/30/22 16:26


Labs: 


                  Abnormal Lab Results - Last 24 Hours (Table)











  07/30/22 07/30/22 07/30/22 Range/Units





  16:26 16:26 16:26 


 


WBC  14.1 H    (3.8-10.6)  k/uL


 


RBC     (4.10-5.20)  X 10*6/uL


 


Hgb     (12.0-15.0)  g/dL


 


Hct     (37.2-46.3)  %


 


MCHC     (32.0-37.0)  g/dL


 


Immature Gran #     (0.00-0.04)  X 10*3/uL


 


Neutrophils #  12.6 H    (1.3-7.7)  k/uL


 


Lymphocytes #  0.8 L    (1.0-4.8)  k/uL


 


Monocytes #     (0.20-1.00)  X 10*3/uL


 


Eosinophils #     (0.04-0.35)  X 10*3/uL


 


Sodium    132 L  (137-145)  mmol/L


 


Glucose    113 H  (74-99)  mg/dL


 


Urine Protein   1+ H   (Negative)  


 


Urine Nitrite   Positive H   (Negative)  


 


Ur Leukocyte Esterase   Moderate H   (Negative)  


 


Urine RBC   8 H   (0-5)  /hpf


 


Urine WBC   66 H   (0-5)  /hpf


 


Urine Bacteria   Moderate H   (None)  /hpf


 


Urine Mucus   Rare H   (None)  /hpf














  07/31/22 Range/Units





  05:09 


 


WBC  14.06 H  (3.8-10.6)  k/uL


 


RBC  3.44 L  (4.10-5.20)  X 10*6/uL


 


Hgb  9.9 L  (12.0-15.0)  g/dL


 


Hct  31.9 L  (37.2-46.3)  %


 


MCHC  31.0 L  (32.0-37.0)  g/dL


 


Immature Gran #  0.08 H  (0.00-0.04)  X 10*3/uL


 


Neutrophils #  11.82 H  (1.3-7.7)  k/uL


 


Lymphocytes #  0.84 L  (1.0-4.8)  k/uL


 


Monocytes #  1.26 H  (0.20-1.00)  X 10*3/uL


 


Eosinophils #  0.01 L  (0.04-0.35)  X 10*3/uL


 


Sodium   (137-145)  mmol/L


 


Glucose   (74-99)  mg/dL


 


Urine Protein   (Negative)  


 


Urine Nitrite   (Negative)  


 


Ur Leukocyte Esterase   (Negative)  


 


Urine RBC   (0-5)  /hpf


 


Urine WBC   (0-5)  /hpf


 


Urine Bacteria   (None)  /hpf


 


Urine Mucus   (None)  /hpf








                      Microbiology - Last 24 Hours (Table)











 07/30/22 16:26 Urine Culture - Preliminary





 Urine,Voided 














Assessment and Plan


Plan: 


1patient presented to hospital with sepsis and respiratory have a fever 

elevated white count tachycardia source is likely right-sided pyelonephritis 

likely from enteric gram-negative pathogen.


2Rocephin 2 g daily to continue while waiting for the culture to finalize.


3gentle IV fluid.


We will follow on clinical condition and cultures to further adjust medication 

if needed


Thank you for this consultation will follow this patient along with you





Time with Patient: Greater than 30

## 2022-08-01 NOTE — P.PN
Subjective


Progress Note Date: 08/01/22











On 08/01/2022 patient was seen and examined on the medical floor she is alert 

and oriented 3 in no apparent distress she is still complaining of pain in the 

right lower quadrant and in the right flank, she is complaining of constipation 

otherwise she denies any complaints there is no fever or chills no headache or 

dizziness no chest pain no shortness of breath no cough no nausea or vomiting no

diarrhea no blood in the stools no burning with urination no frequency or 

urgency and no hematuria.  White blood count has declined over the last 2 days, 

abdomen ultrasound and abdomen computed tomography scan results reviewed, at 

this time continue with current medications, will add 1 dose of milk of magnesia

and continue with IV Rocephin will recheck in a.m.





Objective





- Vital Signs


Vital signs: 


                                   Vital Signs











Temp  98.4 F   08/01/22 15:00


 


Pulse  71   08/01/22 15:00


 


Resp  18   08/01/22 15:00


 


BP  105/70   08/01/22 15:00


 


Pulse Ox  96   08/01/22 15:00


 


FiO2      








                                 Intake & Output











 07/31/22 08/01/22 08/01/22





 18:59 06:59 18:59


 


Intake Total   120


 


Balance   120


 


Intake:   


 


  Oral   120


 


Other:   


 


  # Voids 4 2 1














- Exam








In general patient is alert and oriented x 3 in no distress


HEENT head normocephalic and atraumatic


Neck is supple no JVD no goiter no lymphadenopathy no carotid bruit


Chest examination is clear to auscultation no crackles no wheezing


Cardiac exam reveals regular heart sounds S1 and S2 no gallops no murmurs


Abdomen is soft with tenderness in the right upper quadrant and right lower 

quadrant no organomegaly with normal bowel sounds


Extremity exam reveals no edema no cyanosis or clubbing


Neurological examination reveals no gross focal deficits





- Labs


CBC & Chem 7: 


                                 08/01/22 03:32





                                 08/01/22 03:32


Labs: 


                  Abnormal Lab Results - Last 24 Hours (Table)











  08/01/22 08/01/22 Range/Units





  03:32 03:32 


 


WBC  10.60 H   (4.50-10.00)  X 10*3/uL


 


RBC  3.68 L   (4.10-5.20)  X 10*6/uL


 


Hgb  10.6 L   (12.0-15.0)  g/dL


 


Hct  35.0 L   (37.2-46.3)  %


 


MCHC  30.3 L   (32.0-37.0)  g/dL


 


RDW  14.6 H   (11.5-14.5)  %


 


Monocytes #  1.02 H   (0.20-1.00)  X 10*3/uL


 


Anion Gap   6.50 L  (10.00-18.00)  mmol/L


 


BUN   7.8 L  (9.0-27.0)  mg/dL


 


Creatinine   0.5 L  (0.6-1.5)  mg/dL


 


Glucose   117 H  ()  mg/dL


 


Calcium   8.2 L  (8.7-10.3)  mg/dL


 


Total Bilirubin   <0.15 L  (0.30-1.20)  mg/dL


 


AST   41 H  (13-35)  U/L


 


Total Protein   5.6 L  (6.2-8.2)  g/dL


 


Albumin   3.3 L  (3.8-4.9)  g/dL


 


Albumin/Globulin Ratio   1.43 L  (1.60-3.17)  g/dL








                      Microbiology - Last 24 Hours (Table)











 07/30/22 16:26 Urine Culture - Final





 Urine,Voided    Escherichia coli


 


 07/30/22 17:15 Blood Culture - Preliminary





 Blood    No Growth after 24 hours


 


 07/30/22 17:00 Blood Culture - Preliminary





 Blood    No Growth after 24 hours














Assessment and Plan


Plan: 








Urinary tract infection with acute right pyelonephritis





Sepsis with Leukocytosis, improving





Underlying history of ADHD





Constipation





At this time add milk of magnesia


Continue with current medication otherwise


Recheck labs in a.m. possible discharge to home tomorrow

## 2022-08-02 VITALS — RESPIRATION RATE: 18 BRPM | TEMPERATURE: 98.7 F

## 2022-08-02 VITALS — HEART RATE: 78 BPM | DIASTOLIC BLOOD PRESSURE: 74 MMHG | SYSTOLIC BLOOD PRESSURE: 145 MMHG

## 2022-08-02 LAB
ALBUMIN SERPL-MCNC: 2.9 G/DL (ref 3.8–4.9)
ALBUMIN/GLOB SERPL: 1.21 G/DL (ref 1.6–3.17)
ALP SERPL-CCNC: 88 U/L (ref 41–126)
ALT SERPL-CCNC: 33 U/L (ref 8–44)
ANION GAP SERPL CALC-SCNC: 6.5 MMOL/L (ref 10–18)
AST SERPL-CCNC: 26 U/L (ref 13–35)
BASOPHILS # BLD AUTO: 0.04 X 10*3/UL (ref 0–0.1)
BASOPHILS NFR BLD AUTO: 0.6 %
BUN SERPL-SCNC: 5.1 MG/DL (ref 9–27)
BUN/CREAT SERPL: 10.2 RATIO (ref 12–20)
CALCIUM SPEC-MCNC: 8.2 MG/DL (ref 8.7–10.3)
CHLORIDE SERPL-SCNC: 105 MMOL/L (ref 96–109)
CO2 SERPL-SCNC: 25.5 MMOL/L (ref 20–27.5)
EOSINOPHIL # BLD AUTO: 0.09 X 10*3/UL (ref 0.04–0.35)
EOSINOPHIL NFR BLD AUTO: 1.3 %
ERYTHROCYTE [DISTWIDTH] IN BLOOD BY AUTOMATED COUNT: 3.24 X 10*6/UL (ref 4.1–5.2)
ERYTHROCYTE [DISTWIDTH] IN BLOOD: 14.8 % (ref 11.5–14.5)
GLOBULIN SER CALC-MCNC: 2.4 G/DL (ref 1.6–3.3)
GLUCOSE SERPL-MCNC: 111 MG/DL (ref 70–110)
HCT VFR BLD AUTO: 30.4 % (ref 37.2–46.3)
HGB BLD-MCNC: 9.7 G/DL (ref 12–15)
IMM GRANULOCYTES BLD QL AUTO: 0.4 %
LYMPHOCYTES # SPEC AUTO: 1.24 X 10*3/UL (ref 0.9–5)
LYMPHOCYTES NFR SPEC AUTO: 17.6 %
MCH RBC QN AUTO: 29.9 PG (ref 27–32)
MCHC RBC AUTO-ENTMCNC: 31.9 G/DL (ref 32–37)
MCV RBC AUTO: 93.8 FL (ref 80–97)
MONOCYTES # BLD AUTO: 0.56 X 10*3/UL (ref 0.2–1)
MONOCYTES NFR BLD AUTO: 7.9 %
NEUTROPHILS # BLD AUTO: 5.1 X 10*3/UL (ref 1.8–7.7)
NEUTROPHILS NFR BLD AUTO: 72.2 %
NRBC BLD AUTO-RTO: 0 /100 WBCS (ref 0–0)
PLATELET # BLD AUTO: 301 X 10*3/UL (ref 140–440)
POTASSIUM SERPL-SCNC: 3.9 MMOL/L (ref 3.5–5.5)
PROT SERPL-MCNC: 5.3 G/DL (ref 6.2–8.2)
SODIUM SERPL-SCNC: 137 MMOL/L (ref 135–145)
WBC # BLD AUTO: 7.06 X 10*3/UL (ref 4.5–10)

## 2022-08-02 RX ADMIN — ONDANSETRON PRN MG: 2 INJECTION INTRAMUSCULAR; INTRAVENOUS at 12:55

## 2022-08-02 RX ADMIN — MORPHINE SULFATE PRN MG: 4 INJECTION, SOLUTION INTRAMUSCULAR; INTRAVENOUS at 13:29

## 2022-08-02 RX ADMIN — PANTOPRAZOLE SODIUM SCH MG: 40 INJECTION, POWDER, FOR SOLUTION INTRAVENOUS at 08:21

## 2022-08-02 RX ADMIN — CEFAZOLIN SCH MLS/HR: 330 INJECTION, POWDER, FOR SOLUTION INTRAMUSCULAR; INTRAVENOUS at 02:28

## 2022-08-02 RX ADMIN — CEFAZOLIN SCH: 330 INJECTION, POWDER, FOR SOLUTION INTRAMUSCULAR; INTRAVENOUS at 13:24

## 2022-08-02 RX ADMIN — MORPHINE SULFATE PRN MG: 4 INJECTION, SOLUTION INTRAMUSCULAR; INTRAVENOUS at 03:27

## 2022-08-02 RX ADMIN — ACETAMINOPHEN PRN MG: 325 TABLET, FILM COATED ORAL at 01:43

## 2022-08-02 RX ADMIN — MORPHINE SULFATE PRN MG: 4 INJECTION, SOLUTION INTRAMUSCULAR; INTRAVENOUS at 08:21

## 2022-08-02 NOTE — P.PN
Subjective


Progress Note Date: 08/02/22











On 08/01/2022 patient was seen and examined on the medical floor she is alert 

and oriented 3 in no apparent distress she is still complaining of pain in the 

right lower quadrant and in the right flank, she is complaining of constipation 

otherwise she denies any complaints there is no fever or chills no headache or 

dizziness no chest pain no shortness of breath no cough no nausea or vomiting no

diarrhea no blood in the stools no burning with urination no frequency or 

urgency and no hematuria.  White blood count has declined over the last 2 days, 

abdomen ultrasound and abdomen computed tomography scan results reviewed, at 

this time continue with current medications, will add 1 dose of milk of magnesia

and continue with IV Rocephin will recheck in a.m.





On 08/02/2022 patient was seen and examined on the medical floor she is alert 

and oriented 3 in no apparent distress she is complaining of abdominal pain, 

constipation and nausea otherwise she denies any complaints there is no fever or

chills no headache or dizziness no chest pain no shortness of breath no cough no

vomiting no diarrhea no blood in the stools no burning with urination no 

frequency or urgency and no hematuria.  At this time patient is still having 

significant issues constipation, her infection is improving she may be able to 

be discharged home once she has a bowel movement, Dulcolax suppository abdomen, 

will follow.





Objective





- Vital Signs


Vital signs: 


                                   Vital Signs











Temp  98.7 F   08/02/22 08:00


 


Pulse  75   08/02/22 08:00


 


Resp  18   08/02/22 08:00


 


BP  112/72   08/02/22 08:00


 


Pulse Ox  100   08/02/22 08:00


 


FiO2      








                                 Intake & Output











 08/01/22 08/02/22 08/02/22





 18:59 06:59 18:59


 


Intake Total 240  


 


Balance 240  


 


Intake:   


 


  Oral 240  


 


Other:   


 


  # Voids 1 2 1














- Exam








In general patient is alert and oriented x 3 in no distress


HEENT head normocephalic and atraumatic


Neck is supple no JVD no goiter no lymphadenopathy no carotid bruit


Chest examination is clear to auscultation no crackles no wheezing


Cardiac exam reveals regular heart sounds S1 and S2 no gallops no murmurs


Abdomen is soft with tenderness in the right upper quadrant and right lower 

quadrant no organomegaly with normal bowel sounds


Extremity exam reveals no edema no cyanosis or clubbing


Neurological examination reveals no gross focal deficits





- Labs


CBC & Chem 7: 


                                 08/02/22 04:40





                                 08/02/22 04:40


Labs: 


                  Abnormal Lab Results - Last 24 Hours (Table)











  08/02/22 08/02/22 Range/Units





  04:40 04:40 


 


RBC  3.24 L   (4.10-5.20)  X 10*6/uL


 


Hgb  9.7 L   (12.0-15.0)  g/dL


 


Hct  30.4 L   (37.2-46.3)  %


 


MCHC  31.9 L   (32.0-37.0)  g/dL


 


RDW  14.8 H   (11.5-14.5)  %


 


Anion Gap   6.50 L  (10.00-18.00)  mmol/L


 


BUN   5.1 L  (9.0-27.0)  mg/dL


 


Creatinine   0.5 L  (0.6-1.5)  mg/dL


 


BUN/Creatinine Ratio   10.20 L  (12.00-20.00)  Ratio


 


Glucose   111 H  ()  mg/dL


 


Calcium   8.2 L  (8.7-10.3)  mg/dL


 


Total Bilirubin   <0.15 L  (0.30-1.20)  mg/dL


 


Total Protein   5.3 L  (6.2-8.2)  g/dL


 


Albumin   2.9 L  (3.8-4.9)  g/dL


 


Albumin/Globulin Ratio   1.21 L  (1.60-3.17)  g/dL








                      Microbiology - Last 24 Hours (Table)











 07/30/22 17:00 Blood Culture - Preliminary





 Blood    No Growth after 48 hours


 


 07/30/22 17:15 Blood Culture - Preliminary





 Blood    No Growth after 48 hours


 


 07/30/22 16:26 Urine Culture - Final





 Urine,Voided    Escherichia coli














Assessment and Plan


Plan: 








Urinary tract infection with acute right pyelonephritis





Sepsis with Leukocytosis, improving





Underlying history of ADHD





Constipation





At this time add milk of magnesia


Continue with current medication otherwise


Recheck labs in a.m. possible discharge to home tomorrow

## 2022-08-02 NOTE — P.DS
Providers


Date of admission: 


08/01/22 15:56





Expected date of discharge: 08/02/22


Attending physician: 


Ramonita Lester





Consults: 





                                        





07/31/22 11:42


Consult Physician Routine 


   Consulting Provider: Kelsea Bonds


   Consult Reason/Comments: sepsis


   Do you want consulting provider notified?: Yes











Primary care physician: 


Ramonita Lester





[facility] Course: 











Diagnosis on discharge:








Urinary tract infection with acute right pyelonephritis





Sepsis with Leukocytosis, improving





Underlying history of ADHD





Constipation











Hospital course:











On 08/01/2022 patient was seen and examined on the medical floor she is alert 

and oriented 3 in no apparent distress she is still complaining of pain in the 

right lower quadrant and in the right flank, she is complaining of constipation 

otherwise she denies any complaints there is no fever or chills no headache or 

dizziness no chest pain no shortness of breath no cough no nausea or vomiting no

diarrhea no blood in the stools no burning with urination no frequency or 

urgency and no hematuria.  White blood count has declined over the last 2 days, 

abdomen ultrasound and abdomen computed tomography scan results reviewed, at 

this time continue with current medications, will add 1 dose of milk of magnesia

and continue with IV Rocephin will recheck in a.m.





On 08/02/2022 patient was seen and examined on the medical floor she is alert 

and oriented 3 in no apparent distress she is complaining of abdominal pain, 

constipation and nausea otherwise she denies any complaints there is no fever or

chills no headache or dizziness no chest pain no shortness of breath no cough no

vomiting no diarrhea no blood in the stools no burning with urination no 

frequency or urgency and no hematuria.  At this time patient is still having 

significant issues constipation, her infection is improving she may be able to 

be discharged home once she has a bowel movement, Dulcolax suppository abdomen, 

will follow.


Patient was evaluated by Dr. Bonds and was cleared for discharge on oral 

antibiotics Cipro 250 mg twice daily for 12 more days


She will be followed in our office within 1 week


Patient Condition at Discharge: Fair





Plan - Discharge Summary


Discharge Rx Participant: Yes


New Discharge Prescriptions: 


New


   Ciprofloxacin HCl [Cipro] 250 mg PO Q12HR 12 Days #24 tab


   ALPRAZolam [Xanax] 0.25 mg PO Q6HR PRN  tab


     PRN Reason: Anxiety





Continue


   Ibuprofen [Motrin] 800 mg PO BID PRN


     PRN Reason: Pain


Discharge Medication List





Ibuprofen [Motrin] 800 mg PO BID PRN 07/30/22 [History]


ALPRAZolam [Xanax] 0.25 mg PO Q6HR PRN  tab 08/02/22 [Rx]


Ciprofloxacin HCl [Cipro] 250 mg PO Q12HR 12 Days #24 tab 08/02/22 [Rx]








Follow up Appointment(s)/Referral(s): 


Ramonita Lester MD [Primary Care Provider] - 1-2 days

## 2022-08-02 NOTE — P.PN
Subjective


Progress Note Date: 08/01/22


Principal diagnosis: 


Fever and pyelonephritis





Patient is a 32-year-old  female presenting to the hospital with right-

sided abdominal pain nausea and vomiting has been diagnosed with right-sided 

pyelonephritis.


On today's evaluation that is 08/01/2022, the patient denies having any fever or

chills, the patient is feeling slightly better however still complaining of righ

t-sided abdominal pain but no worsening some nausea but no vomiting no  

diarrhea, no chest pain shortness of breath or cough





Objective





- Vital Signs


Vital signs: 


                                   Vital Signs











Temp  98.8 F   08/01/22 09:48


 


Pulse  77   08/01/22 07:00


 


Resp  18   08/01/22 07:00


 


BP  113/78   08/01/22 09:48


 


Pulse Ox  100   08/01/22 07:00


 


FiO2      








                                 Intake & Output











 07/31/22 08/01/22 08/01/22





 18:59 06:59 18:59


 


Other:   


 


  # Voids 4 2 














- Exam


GENERAL DESCRIPTION: Middle-aged female lying in bed in no distress





RESPIRATORY SYSTEM: Unlabored breathing , decreased breath sounds at bases





HEART: S1 S2 regular rate and rhythm ,





ABDOMEN: Soft , no tenderness





EXTREMITIES: No edema feet








- Labs


CBC & Chem 7: 


                                 08/01/22 03:32





                                 08/01/22 03:32


Labs: 


                  Abnormal Lab Results - Last 24 Hours (Table)











  08/01/22 08/01/22 Range/Units





  03:32 03:32 


 


WBC  10.60 H   (4.50-10.00)  X 10*3/uL


 


RBC  3.68 L   (4.10-5.20)  X 10*6/uL


 


Hgb  10.6 L   (12.0-15.0)  g/dL


 


Hct  35.0 L   (37.2-46.3)  %


 


MCHC  30.3 L   (32.0-37.0)  g/dL


 


RDW  14.6 H   (11.5-14.5)  %


 


Monocytes #  1.02 H   (0.20-1.00)  X 10*3/uL


 


Anion Gap   6.50 L  (10.00-18.00)  mmol/L


 


BUN   7.8 L  (9.0-27.0)  mg/dL


 


Creatinine   0.5 L  (0.6-1.5)  mg/dL


 


Glucose   117 H  ()  mg/dL


 


Calcium   8.2 L  (8.7-10.3)  mg/dL


 


Total Bilirubin   <0.15 L  (0.30-1.20)  mg/dL


 


AST   41 H  (13-35)  U/L


 


Total Protein   5.6 L  (6.2-8.2)  g/dL


 


Albumin   3.3 L  (3.8-4.9)  g/dL


 


Albumin/Globulin Ratio   1.43 L  (1.60-3.17)  g/dL








                      Microbiology - Last 24 Hours (Table)











 07/30/22 16:26 Urine Culture - Preliminary





 Urine,Voided    Gram Neg Bacilli


 


 07/30/22 17:15 Blood Culture - Preliminary





 Blood    No Growth after 24 hours


 


 07/30/22 17:00 Blood Culture - Preliminary





 Blood    No Growth after 24 hours














Assessment and Plan


(1) Pyelonephritis


Current Visit: Yes   Status: Acute   Code(s): N12 - TUBULO-INTERSTITIAL 

NEPHRITIS, NOT SPCF AS ACUTE OR CHRONIC   SNOMED Code(s): 19912277


   


Plan: 


1patient presented to hospital with sepsis and respiratory have a fever 

elevated white count tachycardia source is likely right-sided pyelonephritis 

likely from enteric gram-negative pathogen.


2patient seemed to have a clinical improvement the urine showing a gram-

negative bacilli blood cultures so far negative, patient to continue with 

Rocephin 2 g daily while waiting for the culture to finalize.


 


Time with Patient: Less than 30

## 2024-12-14 ENCOUNTER — HOSPITAL ENCOUNTER (EMERGENCY)
Dept: HOSPITAL 47 - EC | Age: 34
Discharge: HOME | End: 2024-12-14
Payer: COMMERCIAL

## 2024-12-14 VITALS
HEART RATE: 105 BPM | TEMPERATURE: 97.5 F | DIASTOLIC BLOOD PRESSURE: 55 MMHG | RESPIRATION RATE: 18 BRPM | SYSTOLIC BLOOD PRESSURE: 122 MMHG

## 2024-12-14 DIAGNOSIS — M25.511: Primary | ICD-10-CM
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
## 2024-12-14 PROCEDURE — 73030 X-RAY EXAM OF SHOULDER: CPT

## 2024-12-14 PROCEDURE — 99283 EMERGENCY DEPT VISIT LOW MDM: CPT

## 2024-12-14 PROCEDURE — 96372 THER/PROPH/DIAG INJ SC/IM: CPT

## 2024-12-14 PROCEDURE — 73010 X-RAY EXAM OF SHOULDER BLADE: CPT

## 2024-12-14 PROCEDURE — 73120 X-RAY EXAM OF HAND: CPT

## 2024-12-14 RX ADMIN — KETOROLAC TROMETHAMINE STA MG: 15 INJECTION, SOLUTION INTRAMUSCULAR; INTRAVENOUS at 08:05

## 2024-12-14 NOTE — XR
EXAMINATION TYPE: XR shoulder complete RT

 

DATE OF EXAM: 12/14/2024 8:14 AM

 

COMPARISON: None

 

CLINICAL INDICATION: Female, 34 years old with history of fall, pain; PHH, pain

 

TECHNIQUE: XR shoulder complete RT; examined in AP, internally rotated and scapular Y projections. 

 

FINDINGS: 

No evidence of acute osseous pathology, joint dislocation, or soft tissue swelling.  The remaining po
rtions of the visualized chest are unremarkable.  

 

 

IMPRESSION: 

No acute osseous pathology.

 

 

[facility], Workstation: [id number], 12/14/2024 8:22 AM

## 2024-12-14 NOTE — ED
General Adult HPI





- General


Chief complaint: Extremity Injury, Upper


Stated complaint: Fall/R Shoulder Pain


Time Seen by Provider: 12/14/24 07:30


Source: patient


Mode of arrival: ambulatory


Limitations: physical limitation





- History of Present Illness


Initial comments: 





34-year-old female who presents emergency department reporting right shoulder 

pain.  States that she fell yesterday.  She was attempting to get out of her 

car.  States that she was carrying a bunch of groceries when she lost her 

balance and fell.  She fell onto her left side.  She sustained an abrasion to 

the left knee.  She is unsure how she fell exactly because she is now having 

right shoulder pain.  Pain is located in the back of the scapula.  States that 

it is painful to reach behind her with her right arm and also to lift.  She did 

take a dose of Motrin earlier this morning.  Patient does have some pain in her 

right middle finger.  No obvious deformity.  Patient is right handed.  She 

denies hitting her head.  No loss of consciousness.  No neck or back pain.  

Denies any chest pain or difficulty breathing.  No abdominal pain.  Patient has 

been ambulatory without difficulty.  No other alleviating, precipitating or 

modifying factors





- Related Data


                                Home Medications











 Medication  Instructions  Recorded  Confirmed


 


Ibuprofen [Motrin] 800 mg PO BID PRN 07/30/22 07/30/22








                                  Previous Rx's











 Medication  Instructions  Recorded


 


ALPRAZolam [Xanax] 0.25 mg PO Q6HR PRN  tab 08/02/22


 


Ciprofloxacin HCl [Cipro] 250 mg PO Q12HR 12 Days #24 tab 08/02/22











                                    Allergies











Allergy/AdvReac Type Severity Reaction Status Date / Time


 


No Known Allergies Allergy   Verified 12/14/24 07:26














Review of Systems


ROS Statement: 


Those systems with pertinent positive or pertinent negative responses have been 

documented in the HPI.





ROS Other: All systems not noted in ROS Statement are negative.





Past Medical History


Past Medical History: No Reported History


History of Any Multi-Drug Resistant Organisms: None Reported


Past Surgical History: No Surgical Hx Reported


Past Psychological History: No Psychological Hx Reported


Smoking Status: Never smoker


Past Alcohol Use History: None Reported


Past Drug Use History: Marijuana





General Exam


Limitations: no limitations


General appearance: alert, in no apparent distress


Head exam: Present: atraumatic, normocephalic, normal inspection


Eye exam: Present: normal appearance, PERRL, EOMI.  Absent: scleral icterus, 

conjunctival injection, periorbital swelling


ENT exam: Present: normal exam, mucous membranes moist


Neck exam: Present: normal inspection.  Absent: tenderness, meningismus, 

lymphadenopathy


Respiratory exam: Present: normal lung sounds bilaterally.  Absent: respiratory 

distress, wheezes, rales, rhonchi, stridor


Cardiovascular Exam: Present: regular rate, normal rhythm, normal heart sounds. 

 Absent: systolic murmur, diastolic murmur, rubs, gallop, clicks


GI/Abdominal exam: Present: soft, normal bowel sounds.  Absent: distended, 

tenderness, guarding, rebound, rigid


Extremities exam: Present: tenderness (Tenderness to palpation of the right 

scapula.  No obvious deformity.  Patient has limited range of motion due when 

reaching behind her back.  She is able to flex and abduction greater than 90 

degrees.  Equal  strength bilaterally.  Mild tenderness along the right 

middle finger), normal capillary refill.  Absent: pedal edema, joint swelling, 

calf tenderness


Back exam: Present: normal inspection


Neurological exam: Present: alert, oriented X3, CN II-XII intact


Psychiatric exam: Present: normal affect, normal mood


Skin exam: Present: warm, dry, intact, normal color.  Absent: rash





Course


                                   Vital Signs











  12/14/24





  07:26


 


Temperature 97.5 F L


 


Pulse Rate 105 H


 


Respiratory 18





Rate 


 


Blood Pressure 122/55


 


O2 Sat by Pulse 100





Oximetry 














Medical Decision Making





- Medical Decision Making





Was pt. sent in by a medical professional or institution (ARMANDO Navarro, NP, urgent 

care, hospital, or nursing home...) When possible be specific


@  -No


Did you speak to anyone other than the patient for history (EMS, parent, family,

 police, friend...)? What history was obtained from this source 


@  -No


Did you review nursing and triage notes (agree or disagree)?  Why? 


@  -I reviewed and agree with nursing and triage notes


Were old charts reviewed (outside hosp., previous admission, EMS record, old 

EKG, old radiological studies, urgent care reports/EKG's, nursing home records)?

 Report findings 


@  -No old charts were reviewed


Differential Diagnosis (chest pain, altered mental status, abdominal pain women,

 abdominal pain men, vaginal bleeding, weakness, fever, dyspnea, syncope, 

headache, dizziness, GI bleed, back pain, seizure, CVA, palpatations, mental 

health, musculoskeletal)? 


@  -Differential Musculoskeletal


Muscular strain, contusion, ligament sprain, fracture, arthritis, septic 

arthritis, bursitis, cellulitis, muscle spasm, nerve compression, DVT, arterial 

occlusion, herpes zoster, electrolyte abnormality, tumor.... This is not meant 

to be in all inclusive list


EKG interpreted by me (3pts min.).


@  -Not done


X-rays interpreted by me (1pt min.).


@  -Yes and demonstrates no acute fractures


CT interpreted by me (1pt min.).


@  -None done


U/S interpreted by me (1pt. min.).


@  -None done


What testing was considered but not performed or refused? (CT, X-rays, U/S, 

labs)? Why?


@  -None


What meds were considered but not given or refused? Why?


@  -None


Did you discuss the management of the patient with other professionals 

(professionals i.e. , PA, NP, lab, RT, psych nurse, , , 

teacher, , )? Give summary


@  -No


Was smoking cessation discussed for >3mins.?


@  -No


Was critical care preformed (if so, how long)?


@  -No


Were there social determinants of health that impacted care today? How? 

(Homelessness, low income, unemployed, alcoholism, drug addiction, 

transportation, low edu. Level, literacy, decrease access to med. care, senior care, 

rehab)?


@  -No


Was there de-escalation of care discussed even if they declined (Discuss DNR or 

withdrawal of care, Hospice)? DNR status


@  -No


What co-morbidities impacted this encounter? (DM, HTN, Smoking, COPD, CAD, 

Cancer, CVA, ARF, Chemo, Hep., AIDS, mental health diagnosis, sleep apnea, 

morbid obesity)?


@  -None


Was patient admitted / discharged? Hospital course, mention meds given and 

route, prescriptions, significant lab abnormalities, going to OR and other 

pertinent info.


@  -Upon arrival patient seen and evaluated in room 19.  Thorough history and 

physical exam was performed.  Patient was given a dose of Toradol.  X-rays are 

performed which demonstrate no acute fracture.  Results are discussed with the 

patient.  She is given a sling for comfort.  Patient is to alternate Motrin with

 Tylenol every 4 hours for pain control.  I did give her follow-up information 

for the orthopedic office.  If patient continues to have shoulder pain she needs

 to follow-up and possibly have an MRI.  Return for any new or worsening 

symptoms.  Patient agreeable to plan was discharged in stable condition


Undiagnosed new problem with uncertain prognosis?


@  -Yes


Drug Therapy requiring intensive monitoring for toxicity (Heparin, Nitro, 

Insulin, Cardizem)?


@  -No


Were any procedures done?


@  -No


Diagnosis/symptom?


@  -Acute right shoulder pain, status post fall


Acute, or Chronic, or Acute on Chronic?


@  -Acute


Uncomplicated (without systemic symptoms) or Complicated (systemic symptoms)?


@  -Uncomplicated


Side effects of treatment?


@  -Reaction


Exacerbation, Progression, or Severe Exacerbation?


@  -No


Poses a threat to life or bodily function? How? (Chest pain, USA, MI, pneumonia,

 PE, COPD, DKA, ARF, appy, cholecystitis, CVA, Diverticulitis, Homicidal, 

Suicidal, threat to staff... and all critical care pts)


@  -No








Disposition


Clinical Impression: 


 Fall, Right shoulder pain, Right hand pain





Disposition: HOME SELF-CARE


Condition: Stable


Instructions (If sedation given, give patient instructions):  Shoulder Sprain 

(ED)


Additional Instructions: 


There is concern for rotator cuff injury.  Please wear the sling for comfort.  

Alternate taking Motrin with Tylenol every 4 hours for pain control.  If your 

pain continues, you will need an MRI.  I have given you the name of the 

orthopedic office that you should call in order to obtain further workup.  

Return for any new or worsening symptoms


Is patient prescribed a controlled substance at d/c from ED?: No


Referrals: 


Blu Mercado MD [Primary Care Provider] - 1-2 days


LESLIE Clark DO [Doctor of Osteopathic Medicine] - 1-2 days


Time of Disposition: 08:46

## 2024-12-14 NOTE — XR
EXAMINATION TYPE: XR hand limited RT

 

DATE OF EXAM: 12/14/2024 8:59 AM

 

COMPARISON: None

 

CLINICAL INDICATION: Female, 34 years old with history of fall; PHH, pain

 

TECHNIQUE: XR hand limited RT 2views were obtained.

 

FINDINGS: Normal alignment of the visualized joints.  No acute osseous pathology is identified.  No e
vidence of soft tissue swelling. No significant degeneration

 

IMPRESSION: 

No acute osseous pathology.

 

[facility], Workstation: [id number], 12/14/2024 9:07 AM

## 2024-12-14 NOTE — XR
EXAMINATION TYPE: XR scapula RT

 

DATE OF EXAM: 12/14/2024 8:13 AM

 

COMPARISON: None

 

CLINICAL INDICATION: Female, 34 years old with history of pain, fall, pain

 

TECHNIQUE: XR scapula RT; examined in AP, internally rotated and scapular Y projections. 

 

FINDINGS: 

No evidence of acute osseous pathology, joint dislocation, or soft tissue swelling.  The remaining po
rtions of the visualized chest are unremarkable.

 

 

IMPRESSION: 

No acute osseous pathology.

 

 

[facility], Workstation: [id number], 12/14/2024 8:17 AM

## 2025-05-21 ENCOUNTER — HOSPITAL ENCOUNTER (EMERGENCY)
Dept: HOSPITAL 47 - EC | Age: 35
Discharge: HOME | End: 2025-05-21
Payer: COMMERCIAL

## 2025-05-21 VITALS
DIASTOLIC BLOOD PRESSURE: 79 MMHG | RESPIRATION RATE: 19 BRPM | TEMPERATURE: 99.5 F | SYSTOLIC BLOOD PRESSURE: 124 MMHG | HEART RATE: 81 BPM

## 2025-05-21 DIAGNOSIS — Z11.52: ICD-10-CM

## 2025-05-21 DIAGNOSIS — R59.0: Primary | ICD-10-CM

## 2025-05-21 LAB
ALBUMIN SERPL-MCNC: 4.5 G/DL (ref 3.5–5)
ALP SERPL-CCNC: 44 U/L (ref 38–126)
ALT SERPL-CCNC: 17 U/L (ref 4–34)
ANION GAP SERPL CALC-SCNC: 12 MMOL/L
AST SERPL-CCNC: 26 U/L (ref 14–36)
BASOPHILS # BLD AUTO: 0.08 10*3/UL (ref 0–0.1)
BASOPHILS NFR BLD AUTO: 1.1 %
BUN SERPL-SCNC: 16 MG/DL (ref 7–17)
CALCIUM SPEC-MCNC: 10.1 MG/DL (ref 8.4–10.2)
CHLORIDE SERPL-SCNC: 97 MMOL/L (ref 98–107)
CO2 SERPL-SCNC: 28 MMOL/L (ref 22–30)
EOSINOPHIL NFR BLD AUTO: 1.4 %
ERYTHROCYTE [DISTWIDTH] IN BLOOD BY AUTOMATED COUNT: 4.26 10*6/UL (ref 4.1–5.2)
ERYTHROCYTE [DISTWIDTH] IN BLOOD: 13.3 % (ref 11.5–14.5)
GLUCOSE SERPL-MCNC: 99 MG/DL (ref 74–99)
HCT VFR BLD AUTO: 39.1 % (ref 37.2–46.3)
HGB BLD-MCNC: 13.3 G/DL (ref 12–15)
LYMPHOCYTES # SPEC AUTO: 1.63 10*3/UL (ref 0.9–5)
LYMPHOCYTES NFR SPEC AUTO: 22.5 %
MCH RBC QN AUTO: 31.2 PG (ref 27–32)
MCV RBC AUTO: 91.8 FL (ref 80–97)
MONOCYTES # BLD AUTO: 0.46 10*3/UL (ref 0.2–1)
MONOCYTES NFR BLD AUTO: 6.4 %
NEUTROPHILS # BLD AUTO: 4.96 10*3/UL (ref 1.8–7.7)
NEUTROPHILS NFR BLD AUTO: 68.5 %
PLATELET # BLD AUTO: 347 10*3/UL (ref 140–440)
POTASSIUM SERPL-SCNC: 3.7 MMOL/L (ref 3.5–5.1)
PROT SERPL-MCNC: 7.4 G/DL (ref 6.3–8.2)
SODIUM SERPL-SCNC: 137 MMOL/L (ref 137–145)
WBC # BLD AUTO: 7.24 10*3/UL (ref 4.5–10)

## 2025-05-21 PROCEDURE — 76536 US EXAM OF HEAD AND NECK: CPT

## 2025-05-21 PROCEDURE — 85025 COMPLETE CBC W/AUTO DIFF WBC: CPT

## 2025-05-21 PROCEDURE — 83605 ASSAY OF LACTIC ACID: CPT

## 2025-05-21 PROCEDURE — 86140 C-REACTIVE PROTEIN: CPT

## 2025-05-21 PROCEDURE — 87636 SARSCOV2 & INF A&B AMP PRB: CPT

## 2025-05-21 PROCEDURE — 36415 COLL VENOUS BLD VENIPUNCTURE: CPT

## 2025-05-21 PROCEDURE — 84703 CHORIONIC GONADOTROPIN ASSAY: CPT

## 2025-05-21 PROCEDURE — 99284 EMERGENCY DEPT VISIT MOD MDM: CPT

## 2025-05-21 PROCEDURE — 80053 COMPREHEN METABOLIC PANEL: CPT

## 2025-05-21 PROCEDURE — 86308 HETEROPHILE ANTIBODY SCREEN: CPT

## 2025-07-21 ENCOUNTER — HOSPITAL ENCOUNTER (EMERGENCY)
Dept: HOSPITAL 47 - EC | Age: 35
LOS: 1 days | Discharge: HOME | End: 2025-07-22
Payer: COMMERCIAL

## 2025-07-21 DIAGNOSIS — R10.9: Primary | ICD-10-CM

## 2025-07-21 LAB
ALBUMIN SERPL-MCNC: 4.4 G/DL (ref 3.5–5)
ALP SERPL-CCNC: 38 U/L (ref 38–126)
ALT SERPL-CCNC: 19 U/L (ref 4–34)
AMORPH SED URNS QL MICRO: (no result) /HPF
AMYLASE SERPL-CCNC: 48 U/L (ref 30–110)
ANION GAP SERPL CALC-SCNC: 7 MMOL/L
ANISOCYTOSIS BLD QL SMEAR: (no result)
APPEARANCE UR: CLEAR
AST SERPL-CCNC: 25 U/L (ref 14–36)
BACTERIA UR QL AUTO: (no result) /HPF
BASO STIPL BLD QL SMEAR: (no result)
BASOPHILS # BLD AUTO: 0.07 10*3/UL (ref 0–0.1)
BASOPHILS NFR BLD AUTO: 1.3 %
BILIRUB BLD-MCNC: 0.2 MG/DL (ref 0.2–1.3)
BILIRUB UR QL CFM: (no result)
BILIRUB UR QL STRIP.AUTO: NEGATIVE
BROAD CASTS [PRESENCE] IN URINE BY COMPUTER ASSISTED METHOD: (no result) /LPF
BUN SERPL-SCNC: 19 MG/DL (ref 7–17)
BURR CELLS BLD QL SMEAR: (no result)
CA CARBONATE CRY #/AREA URNS HPF: (no result) /HPF
CA PHOS CRY UR QL COMP ASSIST: (no result) /HPF
CALCIUM SPEC-MCNC: 9.8 MG/DL (ref 8.4–10.2)
CAOX CRY UR QL COMP ASSIST: (no result) /HPF
CASTS URNS QL MICRO: (no result) /LPF
CHLORIDE SERPL-SCNC: 103 MMOL/L (ref 98–107)
CO2 SERPL-SCNC: 27 MMOL/L (ref 22–30)
COLOR UR: COLORLESS
CREATININE: 0.64 MG/DL (ref 0.52–1.04)
CRYSTALS UR QL: (no result) /HPF
CYSTINE CRY #/AREA URNS HPF: (no result) /HPF
DACRYOCYTES BLD QL SMEAR: (no result)
DOHLE BOD BLD QL SMEAR: (no result)
EOSINOPHIL # BLD AUTO: 0.3 10*3/UL (ref 0.04–0.35)
EOSINOPHIL NFR BLD AUTO: 5.6 %
EPITHELIAL CASTS [PRESENCE] IN URINE BY COMPUTER ASSISTED METHOD: (no result) /LPF
ERYTHROCYTE CLUMPS [PRESENCE] IN URINE BY COMPUTER ASSISTED METHOD: (no result) /HPF
ERYTHROCYTE [DISTWIDTH] IN BLOOD BY AUTOMATED COUNT: 3.82 10*6/UL (ref 4.1–5.2)
ERYTHROCYTE [DISTWIDTH] IN BLOOD: 12.8 % (ref 11.5–14.5)
FATTY CASTS #/AREA UR COMP ASSIST: (no result) /LPF
GLUCOSE SERPL-MCNC: 89 MG/DL (ref 74–99)
GLUCOSE UR QL: NEGATIVE
GRAN CASTS UR QL COMP ASSIST: (no result) /LPF
HCT VFR BLD AUTO: 35.3 % (ref 37.2–46.3)
HGB BLD-MCNC: 11.8 G/DL (ref 12–15)
HOWELL-JOLLY BOD BLD QL SMEAR: (no result)
HYALINE CASTS UR QL AUTO: (no result) /LPF (ref 0–2)
HYPOCHROMIA BLD QL SMEAR: (no result)
IMM GRANULOCYTES # BLD: 0.01 10*3/UL (ref 0–0.04)
KETONES UR QL STRIP.AUTO: NEGATIVE
LEUCINE CRYSTALS [PRESENCE] IN URINE BY COMPUTER ASSISTED METHOD: (no result) /HPF
LEUKOCYTE ESTERASE UR QL STRIP.AUTO: NEGATIVE
LG PLATELETS BLD QL SMEAR: (no result)
LIPASE SERPL-CCNC: 118 U/L (ref 23–300)
LYMPHOCYTES # SPEC AUTO: 2.11 10*3/UL (ref 0.9–5)
LYMPHOCYTES NFR SPEC AUTO: 39.2 %
Lab: (no result)
MCH RBC QN AUTO: 30.9 PG (ref 27–32)
MCHC RBC AUTO-ENTMCNC: 33.4 G/DL (ref 32–37)
MCV RBC AUTO: 92.4 FL (ref 80–97)
MIXED CELL CASTS UR QL COMP ASSIST: (no result) /LPF
MONOCYTES # BLD AUTO: 0.39 10*3/UL (ref 0.2–1)
MONOCYTES NFR BLD AUTO: 7.2 %
MUCOUS THREADS UR QL AUTO: (no result) /HPF
NEUTROPHILS # BLD AUTO: 2.5 10*3/UL (ref 1.8–7.7)
NEUTROPHILS NFR BLD AUTO: 46.5 %
NEUTS HYPERSEG # BLD: (no result) 10*3/UL
NITRITE UR QL STRIP.AUTO: NEGATIVE
NRBC BLD AUTO-RTO: (no result) %
OVAL FAT BODIES #/AREA UR COMP ASSIST: (no result) /HPF
OVALOCYTES BLD QL SMEAR: (no result)
PELGER HUET CELLS BLD QL SMEAR: (no result)
PH UR: 6 [PH] (ref 5–8)
PLATELET # BLD AUTO: 324 10*3/UL (ref 140–440)
PLATELETS.RETICULATED NFR BLD AUTO: (no result) %
PMV BLD AUTO: 9.6 FL (ref 9.5–12.2)
POIKILOCYTOSIS BLD QL SMEAR: (no result)
POIKILOCYTOSIS BLD QL SMEAR: (no result)
POLYCHROMASIA BLD QL SMEAR: (no result)
POTASSIUM SERPL-SCNC: 4.2 MMOL/L (ref 3.5–5.1)
PROT SERPL-MCNC: 7 G/DL (ref 6.3–8.2)
PROT UR QL SSA: (no result)
PROT UR QL: NEGATIVE
RBC CASTS UR QL COMP ASSIST: (no result) /LPF
RBC MORPH BLD: (no result)
RBC UR QL: (no result) /HPF (ref 0–5)
RBC UR QL: NEGATIVE
RENAL EPI CELLS UR QL COMP ASSIST: (no result) /HPF
ROULEAUX BLD QL SMEAR: (no result)
SCHISTOCYTES BLD QL SMEAR: (no result)
SICKLE CELLS BLD QL SMEAR: (no result)
SODIUM SERPL-SCNC: 137 MMOL/L (ref 137–145)
SP GR UR: 1.01 (ref 1–1.03)
SPECIMEN SOURCE: (no result)
SPECIMEN SOURCE: (no result)
SPERM # UR AUTO: (no result) /HPF
SPHEROCYTES BLD QL SMEAR: (no result)
SQUAMOUS UR QL AUTO: (no result) /HPF (ref 0–4)
STOMATOCYTES BLD QL SMEAR: (no result)
TARGETS BLD QL SMEAR: (no result)
TOXIC GRANULES BLD QL SMEAR: (no result)
TRANS CELLS UR QL COMP ASSIST: (no result) /HPF (ref 0–1)
TRI-PHOS CRY UR QL COMP ASSIST: (no result) /HPF
TRICHOMONAS UR QL COMP ASSIST: (no result) /HPF
TYROSINE CRYSTALS [PRESENCE] IN URINE BY COMPUTER ASSISTED METHOD: (no result) /HPF
URATE CRY UR QL COMP ASSIST: (no result) /HPF
UROBILINOGEN UR QL STRIP: <2 MG/DL (ref ?–2)
VARIANT LYMPHS BLD QL SMEAR: (no result)
WAXY CASTS #/AREA UR COMP ASSIST: (no result) /LPF
WBC # BLD AUTO: 5.38 10*3/UL (ref 4.5–10)
WBC #/AREA URNS HPF: (no result) /HPF (ref 0–5)
WBC CASTS #/AREA URNS LPF: (no result) /LPF
WBC CLUMPS UR QL AUTO: (no result) /HPF
WBC NRBC COR # BLD: (no result) K/UL
WBC TOXIC VACUOLES BLD QL SMEAR: (no result)
YEAST BUDDING UR QL COMP ASSIST: (no result) /HPF
YEAST HYPHAE UR QL COMP ASSIST: (no result) /HPF

## 2025-07-21 PROCEDURE — 96361 HYDRATE IV INFUSION ADD-ON: CPT

## 2025-07-21 PROCEDURE — 36415 COLL VENOUS BLD VENIPUNCTURE: CPT

## 2025-07-21 PROCEDURE — 87591 N.GONORRHOEAE DNA AMP PROB: CPT

## 2025-07-21 PROCEDURE — 81003 URINALYSIS AUTO W/O SCOPE: CPT

## 2025-07-21 PROCEDURE — 96375 TX/PRO/DX INJ NEW DRUG ADDON: CPT

## 2025-07-21 PROCEDURE — 82150 ASSAY OF AMYLASE: CPT

## 2025-07-21 PROCEDURE — 80053 COMPREHEN METABOLIC PANEL: CPT

## 2025-07-21 PROCEDURE — 99284 EMERGENCY DEPT VISIT MOD MDM: CPT

## 2025-07-21 PROCEDURE — 87491 CHLMYD TRACH DNA AMP PROBE: CPT

## 2025-07-21 PROCEDURE — 85025 COMPLETE CBC W/AUTO DIFF WBC: CPT

## 2025-07-21 PROCEDURE — 83690 ASSAY OF LIPASE: CPT

## 2025-07-21 PROCEDURE — 96365 THER/PROPH/DIAG IV INF INIT: CPT

## 2025-07-21 PROCEDURE — 74176 CT ABD & PELVIS W/O CONTRAST: CPT

## 2025-07-21 PROCEDURE — 81025 URINE PREGNANCY TEST: CPT

## 2025-07-21 RX ADMIN — HYDROMORPHONE HYDROCHLORIDE STA MG: 1 INJECTION, SOLUTION INTRAMUSCULAR; INTRAVENOUS; SUBCUTANEOUS at 23:15

## 2025-07-21 RX ADMIN — KETOROLAC TROMETHAMINE STA MG: 15 INJECTION, SOLUTION INTRAMUSCULAR; INTRAVENOUS at 22:07

## 2025-07-21 RX ADMIN — CEFAZOLIN SCH MLS/HR: 330 INJECTION, POWDER, FOR SOLUTION INTRAMUSCULAR; INTRAVENOUS at 22:08

## 2025-07-21 RX ADMIN — CEFTRIAXONE STA MLS/HR: 1 INJECTION, POWDER, FOR SOLUTION INTRAMUSCULAR; INTRAVENOUS at 22:06

## 2025-07-22 VITALS
DIASTOLIC BLOOD PRESSURE: 83 MMHG | RESPIRATION RATE: 16 BRPM | TEMPERATURE: 97.9 F | HEART RATE: 58 BPM | SYSTOLIC BLOOD PRESSURE: 135 MMHG

## 2025-07-22 RX ADMIN — ONDANSETRON STA MG: 2 INJECTION INTRAMUSCULAR; INTRAVENOUS at 00:52

## 2025-07-22 RX ADMIN — HYDROXYZINE HYDROCHLORIDE STA MG: 25 TABLET, FILM COATED ORAL at 01:03
